# Patient Record
Sex: MALE | Race: WHITE | Employment: FULL TIME | URBAN - METROPOLITAN AREA
[De-identification: names, ages, dates, MRNs, and addresses within clinical notes are randomized per-mention and may not be internally consistent; named-entity substitution may affect disease eponyms.]

---

## 2018-06-19 PROBLEM — G43.109 MIGRAINE WITH AURA AND WITHOUT STATUS MIGRAINOSUS, NOT INTRACTABLE: Status: ACTIVE | Noted: 2018-06-19

## 2018-06-19 PROBLEM — Z72.0 TOBACCO USE: Status: ACTIVE | Noted: 2018-06-19

## 2019-04-04 PROBLEM — Z91.199 NO-SHOW FOR APPOINTMENT: Status: ACTIVE | Noted: 2019-04-04

## 2019-06-27 PROBLEM — K43.9 VENTRAL HERNIA WITHOUT OBSTRUCTION OR GANGRENE: Status: ACTIVE | Noted: 2019-06-27

## 2020-01-13 ENCOUNTER — HOSPITAL ENCOUNTER (OUTPATIENT)
Dept: CT IMAGING | Age: 35
Discharge: HOME | End: 2020-01-13
Attending: SURGERY
Payer: COMMERCIAL

## 2020-01-13 DIAGNOSIS — K43.9 VENTRAL HERNIA WITHOUT OBSTRUCTION OR GANGRENE: ICD-10-CM

## 2020-01-13 PROCEDURE — 74176 CT ABD & PELVIS W/O CONTRAST: CPT

## 2020-02-03 PROBLEM — N20.0 KIDNEY STONES: Status: ACTIVE | Noted: 2020-02-03

## 2020-02-03 PROBLEM — R16.1 SPLENIC MASS: Status: ACTIVE | Noted: 2020-02-03

## 2020-02-03 PROBLEM — J98.6 DIAPHRAGM PARALYSIS: Status: ACTIVE | Noted: 2020-02-03

## 2020-02-07 RX ORDER — SODIUM CHLORIDE 0.9 % (FLUSH) 0.9 %
5-40 SYRINGE (ML) INJECTION AS NEEDED
Status: CANCELLED | OUTPATIENT
Start: 2020-02-07

## 2020-02-07 RX ORDER — SODIUM CHLORIDE 0.9 % (FLUSH) 0.9 %
5-40 SYRINGE (ML) INJECTION EVERY 8 HOURS
Status: CANCELLED | OUTPATIENT
Start: 2020-02-07

## 2020-02-20 ENCOUNTER — HOSPITAL ENCOUNTER (OUTPATIENT)
Dept: LAB | Age: 35
Discharge: HOME | End: 2020-02-20
Payer: COMMERCIAL

## 2020-02-20 ENCOUNTER — ANESTHESIA EVENT (OUTPATIENT)
Dept: SURGERY | Age: 35
End: 2020-02-20
Payer: COMMERCIAL

## 2020-02-21 ENCOUNTER — ANESTHESIA (OUTPATIENT)
Dept: SURGERY | Age: 35
End: 2020-02-21
Payer: COMMERCIAL

## 2020-02-21 ENCOUNTER — HOSPITAL ENCOUNTER (OUTPATIENT)
Age: 35
Setting detail: OUTPATIENT SURGERY
Discharge: HOME | End: 2020-02-21
Attending: SURGERY | Admitting: SURGERY
Payer: COMMERCIAL

## 2020-02-21 VITALS
OXYGEN SATURATION: 93 % | HEIGHT: 67 IN | WEIGHT: 171.13 LBS | TEMPERATURE: 97.7 F | DIASTOLIC BLOOD PRESSURE: 81 MMHG | HEART RATE: 85 BPM | SYSTOLIC BLOOD PRESSURE: 121 MMHG | RESPIRATION RATE: 16 BRPM | BODY MASS INDEX: 26.86 KG/M2

## 2020-02-21 PROBLEM — K43.6 INCARCERATED VENTRAL HERNIA: Status: ACTIVE | Noted: 2019-06-27

## 2020-02-21 PROCEDURE — 77030019908 HC STETH ESOPH SIMS -A: Performed by: ANESTHESIOLOGY

## 2020-02-21 PROCEDURE — 77030039425 HC BLD LARYNG TRULITE DISP TELE -A: Performed by: ANESTHESIOLOGY

## 2020-02-21 PROCEDURE — 76210000020 HC REC RM PH II FIRST 0.5 HR: Performed by: SURGERY

## 2020-02-21 PROCEDURE — 74011250636 HC RX REV CODE- 250/636: Performed by: NURSE ANESTHETIST, CERTIFIED REGISTERED

## 2020-02-21 PROCEDURE — 74011000250 HC RX REV CODE- 250: Performed by: NURSE ANESTHETIST, CERTIFIED REGISTERED

## 2020-02-21 PROCEDURE — 77030040922 HC BLNKT HYPOTHRM STRY -A: Performed by: ANESTHESIOLOGY

## 2020-02-21 PROCEDURE — 76010000149 HC OR TIME 1 TO 1.5 HR: Performed by: SURGERY

## 2020-02-21 PROCEDURE — 77030037088 HC TUBE ENDOTRACH ORAL NSL COVD-A: Performed by: ANESTHESIOLOGY

## 2020-02-21 PROCEDURE — 76060000033 HC ANESTHESIA 1 TO 1.5 HR: Performed by: SURGERY

## 2020-02-21 PROCEDURE — 76210000063 HC OR PH I REC FIRST 0.5 HR: Performed by: SURGERY

## 2020-02-21 PROCEDURE — 77030003029 HC SUT VCRL J&J -B: Performed by: SURGERY

## 2020-02-21 PROCEDURE — 77030018836 HC SOL IRR NACL ICUM -A: Performed by: SURGERY

## 2020-02-21 PROCEDURE — 74011250636 HC RX REV CODE- 250/636: Performed by: ANESTHESIOLOGY

## 2020-02-21 PROCEDURE — C1781 MESH (IMPLANTABLE): HCPCS | Performed by: SURGERY

## 2020-02-21 PROCEDURE — 77030040361 HC SLV COMPR DVT MDII -B: Performed by: SURGERY

## 2020-02-21 PROCEDURE — 77030031139 HC SUT VCRL2 J&J -A: Performed by: SURGERY

## 2020-02-21 PROCEDURE — 77030008462 HC STPLR SKN PROX J&J -A: Performed by: SURGERY

## 2020-02-21 PROCEDURE — 77030002986 HC SUT PROL J&J -A: Performed by: SURGERY

## 2020-02-21 PROCEDURE — 74011250637 HC RX REV CODE- 250/637: Performed by: ANESTHESIOLOGY

## 2020-02-21 PROCEDURE — 74011000250 HC RX REV CODE- 250: Performed by: SURGERY

## 2020-02-21 PROCEDURE — 77030018673: Performed by: SURGERY

## 2020-02-21 PROCEDURE — 74011250636 HC RX REV CODE- 250/636: Performed by: SURGERY

## 2020-02-21 DEVICE — BARD VENTRALEX HERNIA PATCH, 4.3 CM (1.7"), SMALL CIRCLE WITH STRAP
Type: IMPLANTABLE DEVICE | Site: ABDOMEN | Status: FUNCTIONAL
Brand: VENTRALEX

## 2020-02-21 RX ORDER — LIDOCAINE HYDROCHLORIDE 10 MG/ML
0.1 INJECTION INFILTRATION; PERINEURAL AS NEEDED
Status: DISCONTINUED | OUTPATIENT
Start: 2020-02-21 | End: 2020-02-21 | Stop reason: HOSPADM

## 2020-02-21 RX ORDER — KETOROLAC TROMETHAMINE 30 MG/ML
INJECTION, SOLUTION INTRAMUSCULAR; INTRAVENOUS AS NEEDED
Status: DISCONTINUED | OUTPATIENT
Start: 2020-02-21 | End: 2020-02-21 | Stop reason: HOSPADM

## 2020-02-21 RX ORDER — CEFAZOLIN SODIUM/WATER 2 G/20 ML
2 SYRINGE (ML) INTRAVENOUS
Status: COMPLETED | OUTPATIENT
Start: 2020-02-21 | End: 2020-02-21

## 2020-02-21 RX ORDER — PROPOFOL 10 MG/ML
INJECTION, EMULSION INTRAVENOUS AS NEEDED
Status: DISCONTINUED | OUTPATIENT
Start: 2020-02-21 | End: 2020-02-21 | Stop reason: HOSPADM

## 2020-02-21 RX ORDER — BUPIVACAINE HYDROCHLORIDE 2.5 MG/ML
INJECTION, SOLUTION EPIDURAL; INFILTRATION; INTRACAUDAL AS NEEDED
Status: DISCONTINUED | OUTPATIENT
Start: 2020-02-21 | End: 2020-02-21 | Stop reason: HOSPADM

## 2020-02-21 RX ORDER — FAMOTIDINE 20 MG/1
20 TABLET, FILM COATED ORAL ONCE
Status: COMPLETED | OUTPATIENT
Start: 2020-02-21 | End: 2020-02-21

## 2020-02-21 RX ORDER — MIDAZOLAM HYDROCHLORIDE 1 MG/ML
2 INJECTION, SOLUTION INTRAMUSCULAR; INTRAVENOUS
Status: COMPLETED | OUTPATIENT
Start: 2020-02-21 | End: 2020-02-21

## 2020-02-21 RX ORDER — ROCURONIUM BROMIDE 10 MG/ML
INJECTION, SOLUTION INTRAVENOUS AS NEEDED
Status: DISCONTINUED | OUTPATIENT
Start: 2020-02-21 | End: 2020-02-21 | Stop reason: HOSPADM

## 2020-02-21 RX ORDER — SODIUM CHLORIDE, SODIUM LACTATE, POTASSIUM CHLORIDE, CALCIUM CHLORIDE 600; 310; 30; 20 MG/100ML; MG/100ML; MG/100ML; MG/100ML
75 INJECTION, SOLUTION INTRAVENOUS CONTINUOUS
Status: DISCONTINUED | OUTPATIENT
Start: 2020-02-21 | End: 2020-02-21 | Stop reason: HOSPADM

## 2020-02-21 RX ORDER — OXYCODONE HYDROCHLORIDE 5 MG/1
10 TABLET ORAL
Status: DISCONTINUED | OUTPATIENT
Start: 2020-02-21 | End: 2020-02-21 | Stop reason: HOSPADM

## 2020-02-21 RX ORDER — DEXAMETHASONE SODIUM PHOSPHATE 4 MG/ML
INJECTION, SOLUTION INTRA-ARTICULAR; INTRALESIONAL; INTRAMUSCULAR; INTRAVENOUS; SOFT TISSUE AS NEEDED
Status: DISCONTINUED | OUTPATIENT
Start: 2020-02-21 | End: 2020-02-21 | Stop reason: HOSPADM

## 2020-02-21 RX ORDER — BACITRACIN 50000 [IU]/1
INJECTION, POWDER, FOR SOLUTION INTRAMUSCULAR AS NEEDED
Status: DISCONTINUED | OUTPATIENT
Start: 2020-02-21 | End: 2020-02-21 | Stop reason: HOSPADM

## 2020-02-21 RX ORDER — OXYCODONE HYDROCHLORIDE 5 MG/1
5 TABLET ORAL
Status: DISCONTINUED | OUTPATIENT
Start: 2020-02-21 | End: 2020-02-21 | Stop reason: HOSPADM

## 2020-02-21 RX ORDER — HYDROMORPHONE HYDROCHLORIDE 2 MG/ML
0.5 INJECTION, SOLUTION INTRAMUSCULAR; INTRAVENOUS; SUBCUTANEOUS
Status: DISCONTINUED | OUTPATIENT
Start: 2020-02-21 | End: 2020-02-21 | Stop reason: HOSPADM

## 2020-02-21 RX ORDER — GENTAMICIN SULFATE 80 MG/100ML
80 INJECTION, SOLUTION INTRAVENOUS ONCE
Status: COMPLETED | OUTPATIENT
Start: 2020-02-21 | End: 2020-02-21

## 2020-02-21 RX ORDER — FENTANYL CITRATE 50 UG/ML
INJECTION, SOLUTION INTRAMUSCULAR; INTRAVENOUS AS NEEDED
Status: DISCONTINUED | OUTPATIENT
Start: 2020-02-21 | End: 2020-02-21 | Stop reason: HOSPADM

## 2020-02-21 RX ORDER — EPHEDRINE SULFATE/0.9% NACL/PF 50 MG/5 ML
SYRINGE (ML) INTRAVENOUS AS NEEDED
Status: DISCONTINUED | OUTPATIENT
Start: 2020-02-21 | End: 2020-02-21 | Stop reason: HOSPADM

## 2020-02-21 RX ORDER — SODIUM CHLORIDE 0.9 % (FLUSH) 0.9 %
5-40 SYRINGE (ML) INJECTION EVERY 8 HOURS
Status: DISCONTINUED | OUTPATIENT
Start: 2020-02-21 | End: 2020-02-21 | Stop reason: HOSPADM

## 2020-02-21 RX ORDER — SODIUM CHLORIDE 0.9 % (FLUSH) 0.9 %
5-40 SYRINGE (ML) INJECTION AS NEEDED
Status: DISCONTINUED | OUTPATIENT
Start: 2020-02-21 | End: 2020-02-21 | Stop reason: HOSPADM

## 2020-02-21 RX ORDER — ONDANSETRON 2 MG/ML
INJECTION INTRAMUSCULAR; INTRAVENOUS AS NEEDED
Status: DISCONTINUED | OUTPATIENT
Start: 2020-02-21 | End: 2020-02-21 | Stop reason: HOSPADM

## 2020-02-21 RX ORDER — LIDOCAINE HYDROCHLORIDE 20 MG/ML
INJECTION, SOLUTION EPIDURAL; INFILTRATION; INTRACAUDAL; PERINEURAL AS NEEDED
Status: DISCONTINUED | OUTPATIENT
Start: 2020-02-21 | End: 2020-02-21 | Stop reason: HOSPADM

## 2020-02-21 RX ADMIN — KETOROLAC TROMETHAMINE 30 MG: 30 INJECTION, SOLUTION INTRAMUSCULAR; INTRAVENOUS at 10:02

## 2020-02-21 RX ADMIN — SODIUM CHLORIDE, SODIUM LACTATE, POTASSIUM CHLORIDE, AND CALCIUM CHLORIDE 75 ML/HR: 600; 310; 30; 20 INJECTION, SOLUTION INTRAVENOUS at 07:51

## 2020-02-21 RX ADMIN — Medication 2 G: at 09:32

## 2020-02-21 RX ADMIN — MIDAZOLAM HYDROCHLORIDE 2 MG: 1 INJECTION, SOLUTION INTRAMUSCULAR; INTRAVENOUS at 09:03

## 2020-02-21 RX ADMIN — PROPOFOL 200 MG: 10 INJECTION, EMULSION INTRAVENOUS at 09:19

## 2020-02-21 RX ADMIN — ROCURONIUM BROMIDE 10 MG: 10 INJECTION, SOLUTION INTRAVENOUS at 09:36

## 2020-02-21 RX ADMIN — FENTANYL CITRATE 100 MCG: 50 INJECTION INTRAMUSCULAR; INTRAVENOUS at 09:19

## 2020-02-21 RX ADMIN — ONDANSETRON 4 MG: 2 INJECTION INTRAMUSCULAR; INTRAVENOUS at 09:57

## 2020-02-21 RX ADMIN — Medication 5 MG: at 09:28

## 2020-02-21 RX ADMIN — ROCURONIUM BROMIDE 40 MG: 10 INJECTION, SOLUTION INTRAVENOUS at 09:20

## 2020-02-21 RX ADMIN — SUGAMMADEX 400 MG: 100 INJECTION, SOLUTION INTRAVENOUS at 10:05

## 2020-02-21 RX ADMIN — LIDOCAINE HYDROCHLORIDE 100 MG: 20 INJECTION, SOLUTION EPIDURAL; INFILTRATION; INTRACAUDAL; PERINEURAL at 09:19

## 2020-02-21 RX ADMIN — GENTAMICIN SULFATE 80 MG: 80 INJECTION, SOLUTION INTRAVENOUS at 08:49

## 2020-02-21 RX ADMIN — FAMOTIDINE 20 MG: 20 TABLET ORAL at 07:51

## 2020-02-21 RX ADMIN — DEXAMETHASONE SODIUM PHOSPHATE 4 MG: 4 INJECTION, SOLUTION INTRAMUSCULAR; INTRAVENOUS at 09:46

## 2020-02-21 NOTE — DISCHARGE INSTRUCTIONS
Leave dressing until follow-up. Try to keep it dry    No heavy lifting (>5lbs) for 6 weeks to reduce risk of developing a recurrent hernia. No driving until you are off pain meds for 24hrs and have no pain with movements associated with driving. Pain prescription (Norco) on chart for patient to use as needed for pain  Follow-up with Dr Khushboo Garcia in 10 days on a Monday in the office at:  [address], [address]    ACTIVITY  · As tolerated and as directed by your doctor. · Bathe or shower as directed by your doctor. DIET  · Clear liquids until no nausea or vomiting; then light diet for the first day. · Advance to regular diet on second day, unless your doctor orders otherwise. · If nausea and vomiting continues, call your doctor. PAIN  · Take pain medication as directed by your doctor. · Call your doctor if pain is NOT relieved by medication. · DO NOT take aspirin of blood thinners unless directed by your doctor. CALL YOUR DOCTOR IF   · Excessive bleeding that does not stop after holding pressure over the area  · Temperature of 101 degrees F or above  · Excessive redness, swelling or bruising, and/ or green or yellow, smelly discharge from incision       After general anesthesia or intravenous sedation, for 24 hours or while taking prescription Narcotics:  · Limit your activities  · Do not drive and operate hazardous machinery  · Do not make important personal or business decisions  · Do  not drink alcoholic beverages  · If you have not urinated within 8 hours after discharge, please contact your surgeon on call. *  Please give a list of your current medications to your Primary Care Provider. *  Please update this list whenever your medications are discontinued, doses are      changed, or new medications (including over-the-counter products) are added. *  Please carry medication information at all times in case of emergency situations.       These are general instructions for a healthy lifestyle:    No smoking/ No tobacco products/ Avoid exposure to second hand smoke    Surgeon General's Warning:  Quitting smoking now greatly reduces serious risk to your health. Obesity, smoking, and sedentary lifestyle greatly increases your risk for illness    A healthy diet, regular physical exercise & weight monitoring are important for maintaining a healthy lifestyle    You may be retaining fluid if you have a history of heart failure or if you experience any of the following symptoms:  Weight gain of 3 pounds or more overnight or 5 pounds in a week, increased swelling in our hands or feet or shortness of breath while lying flat in bed. Please call your doctor as soon as you notice any of these symptoms; do not wait until your next office visit. Recognize signs and symptoms of STROKE:    F-face looks uneven    A-arms unable to move or move unevenly    S-speech slurred or non-existent    T-time-call 911 as soon as signs and symptoms begin-DO NOT go       Back to bed or wait to see if you get better-TIME IS BRAIN.

## 2020-02-21 NOTE — H&P
H&P/Consult Note/Progress Note/Office Note:   Debby Rodriguez  MRN: [id number]  FND:[birth year]  Age:34 y.o.    HPI: Debby Rodriguez is a 27 y.o. male who is here for ventral hernia reapair with mesh on 2/21/20. Prior to surgery he saw me on 6/27/19 with bulging in his upper abdomen. He noticed this with straining and valsalva. He could hear noise in the upper abdomen when he palpated this. No N/V. Nothing seems to make his symptoms better or worse. He had open heart surgery as a child to repair for Tetralogy of Fallot. I previously repaired a right indirect inguinal hernia with mesh on 3/23/12. He is doing well from this and has no symptoms bulging or pain in the groin. 1/13/20 CT abd/pelvis without contrast  Hx:  Ventral hernia, abdominal pain  COMPARISON: None     FINDINGS:  -LUNG BASES: Left diaphragm is elevated with associated scarring/atelectasis in the left lung base. Right lung base is clear.     -LIVER: Normal in size and appearance. -GALLBLADDER/BILE DUCTS: No gallstones or bile duct dilatation. -PANCREAS: Normal.  -SPLEEN: Spleen is normal in size and appearance. There is a 16 mm mass in the splenic hilum near the stomach and tail of the pancreas.     -ADRENALS: Normal.  -KIDNEYS/URETERS: Small nonobstructing stones in both kidneys. No hydronephrosis or significant mass. -BLADDER: Normal.  -REPRODUCTIVE ORGANS: No pelvic masses.     -BOWEL: Normal caliber. No inflammatory changes. -LYMPH NODES: No significant retroperitoneal, mesenteric, or pelvic adenopathy. There are surgical staples in the left groin. -BONES: No fracture or significant bone lesion. -VASCULATURE: Normal  -OTHER: There is a midline ventral hernia just above the umbilicus. Defect in the peritoneum measures 2.4 cm diameter. There is no bowel involvement.     IMPRESSION:   1. Small ventral hernia, no bowel involvement. 2.  Elevated left diaphragm, most likely a paralyzed diaphragm.   3. Small indeterminate mass in the splenic hilum. Most likely not significant  given the patient's age and lack of other findings. Consider follow-up to document stability.   4.  Nonobstructing stones in both kidneys.             Past Medical History:   Diagnosis Date    CAD (coronary artery disease)     patient has had aortic valve replaced twice at 4.1/2 and age 23 pig valve    History of valve replacement     S/P REPAIR TETRALOGY OF FALLOT AS CHILD    Inguinal hernia     Migraine with aura and without status migrainosus, not intractable 6/19/2018    Other forms of migraine, without mention of intractable migraine without mention of status migrainosus     Other psoriasis     PUD (peptic ulcer disease)     patient denies , but said he thought he had one in the past, never diagnose    Smoker     Unspecified essential hypertension     no treatment , required at present     Past Surgical History:   Procedure Laterality Date    CARDIAC SURG PROCEDURE UNLIST  2005/1988    valve replacements, aortic    [address] and 2005    x2    HX HEENT Bilateral age a small child    corrective for amblioplia    HX HERNIA REPAIR  2012    right inguinal    HX ORTHOPAEDIC Right     right knee bone chip rem'd    VASCULAR SURGERY PROCEDURE UNLIST  as an infant    right  axillary surgery-shunt,      Current Facility-Administered Medications   Medication Dose Route Frequency    ceFAZolin (ANCEF) 2 g/20 mL in sterile water IV syringe  2 g IntraVENous ON CALL TO OR    lidocaine (XYLOCAINE) 10 mg/mL (1 %) injection 0.1 mL  0.1 mL SubCUTAneous PRN    lactated Ringers infusion  75 mL/hr IntraVENous CONTINUOUS    sodium chloride (NS) flush 5-40 mL  5-40 mL IntraVENous Q8H    sodium chloride (NS) flush 5-40 mL  5-40 mL IntraVENous PRN    gentamicin in Saline (Iso-osm) (GARAMYCIN) 80 mg/100 mL IVPB premix 80 mg  80 mg IntraVENous ONCE     Bactrim [sulfamethoprim] and Demerol [meperidine]  Social History Socioeconomic History    Marital status:      Spouse name: Not on file    Number of children: Not on file    Years of education: Not on file    Highest education level: Not on file   Tobacco Use    Smoking status: Current Every Day Smoker     Packs/day: 1.00     Years: 12.00     Pack years: 12.00    Smokeless tobacco: Never Used    Tobacco comment: x 10 years. Advised to quit is tapering off. Substance and Sexual Activity    Alcohol use: No    Drug use: No     Social History     Tobacco Use   Smoking Status Current Every Day Smoker    Packs/day: 1.00    Years: 12.00    Pack years: 12.00   Smokeless Tobacco Never Used   Tobacco Comment    x 10 years. Advised to quit is tapering off. Family History   Problem Relation Age of Onset    Hypertension Mother     Hypertension Father     No Known Problems Sister     No Known Problems Brother     Diabetes Maternal Grandfather     Cancer Paternal Grandfather     Stroke Neg Hx     Heart Disease Neg Hx      ROS: The patient has no difficulty with chest pain or shortness of breath. No fever or chills. Comprehensive review of systems was otherwise unremarkable except as noted above. Physical Exam:   Visit Vitals  /89 (BP 1 Location: Left arm, BP Patient Position: At rest;Supine)   Pulse 81   Temp 97.8 °F (36.6 °C)   Resp 18   Ht 5' 7\" (1.702 m)   Wt 171 lb 2 oz (77.6 kg)   SpO2 100%   BMI 26.80 kg/m²     Vitals:    02/21/20 0735   BP: 151/89   Pulse: 81   Resp: 18   Temp: 97.8 °F (36.6 °C)   SpO2: 100%   Weight: 171 lb 2 oz (77.6 kg)   Height: 5' 7\" (1.702 m)     [facility]  [unfilled]    Constitutional: Alert, oriented, cooperative patient in no acute distress; appears stated age    Eyes:Sclera are clear. EOMs intact  ENMT: no external lesions gross hearing normal; no obvious neck masses, no ear or lip lesions, nares normal  CV: RRR. Normal perfusion  Resp: No JVD. Breathing is  non-labored; no audible wheezing.       GI: soft and non-distended  He has a healed midline sternal incision which extends onto his upper abdomen with 2 large scars from chest tube sites. He is a fairly large reducible hernia in his upper midline between his umbilicus and xiphoid. RIH repair is intact  Healed incision  No testicular masses    Musculoskeletal: unremarkable with normal function. No embolic signs or cyanosis. Neuro:  Oriented; moves all 4; no focal deficits  Psychiatric: normal affect and mood, no memory impairment    Recent vitals (if inpt):  @IPVITALS(24:)@    Labs:  Recent Labs     02/20/20  1500   HGB 14.3       Lab Results   Component Value Date/Time    WBC 7.1 06/25/2018 08:22 AM    HGB 14.3 02/20/2020 03:00 PM    PLATELET 817 53/31/5435 08:22 AM    Sodium 141 06/25/2018 08:22 AM    Potassium 4.5 06/25/2018 08:22 AM    Chloride 102 06/25/2018 08:22 AM    CO2 25 06/25/2018 08:22 AM    BUN 11 06/25/2018 08:22 AM    Creatinine 1.23 06/25/2018 08:22 AM    Glucose 84 06/25/2018 08:22 AM    Bilirubin, total 0.6 06/25/2018 08:22 AM    AST (SGOT) 19 06/25/2018 08:22 AM    ALT (SGPT) 13 06/25/2018 08:22 AM    Alk. phosphatase 75 06/25/2018 08:22 AM       CT Results  (Last 48 hours)    None        chest X-ray      I reviewed recent labs, recent radiologic studies, and pertinent records including other doctor notes if needed. I independently reviewed radiology images for studies I described above or studies I have ordered.    Admission date (for inpatients): 2/21/2020   [unfilled]  [unfilled]    ASSESSMENT/PLAN:  Problem List  Date Reviewed: 2/3/2020          Codes Class Noted    1.6cm hilar splenic mass on CT Jan, 2020 ICD-10-CM: R16.1  ICD-9-CM: 789.2  2/3/2020        Bilat asymptomatic Kidney stones seen on CT Jan, 2020 ICD-10-CM: N20.0  ICD-9-CM: 592.0  2/3/2020        Left hemidiaphragmatic elevation shown on Jan, 20202 CT (likely paralysis from childhood heart surgery) ICD-10-CM: J98.6  ICD-9-CM: 519.4  2/3/2020        Ventral hernia without obstruction or gangrene ICD-10-CM: K43.9  ICD-9-CM: 553.20  6/27/2019        No-show for appointment ICD-10-CM: Z53.29  ICD-9-CM: V64.2  4/4/2019        Migraine with aura and without status migrainosus, not intractable ICD-10-CM: G43.109  ICD-9-CM: 346.00  6/19/2018        Tobacco use ICD-10-CM: Z72.0  ICD-9-CM: 305.1  6/19/2018        Other forms of migraine, without mention of intractable migraine without mention of status migrainosus ICD-10-CM: G43.809  ICD-9-CM: 346.80  Unknown        Other psoriasis ICD-10-CM: L40.8  ICD-9-CM: 696.1  Unknown        Essential hypertension ICD-10-CM: I10  ICD-9-CM: 401.9  Unknown        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 We discussed risks/benefits and advantages/disadvantages of ventral hernia repair. Informed consent obtained for ventral hernia repair with possible mesh. We will schedule this for him soon. I have personally performed a face-to-face diagnostic evaluation and management  service on this patient. I have independently seen the patient. I have independently obtained the above history from the patient/family. I have independently examined the patient with above findings. I have independently reviewed data/labs for this patient and developed the above plan of care (MDM). Signed: Flakito Cherry.  Meryle Mealing, MD, FACS

## 2020-02-21 NOTE — ANESTHESIA POSTPROCEDURE EVALUATION
Procedure(s): HERNIA VENTRAL REPAIR WITH MESH. general    Anesthesia Post Evaluation      Multimodal analgesia: multimodal analgesia not used between 6 hours prior to anesthesia start to PACU discharge  Patient location during evaluation: PACU  Patient participation: complete - patient participated  Level of consciousness: awake and alert  Pain management: adequate  Airway patency: patent  Anesthetic complications: no  Cardiovascular status: hemodynamically stable  Respiratory status: acceptable  Hydration status: acceptable        Vitals Value Taken Time   /95 2/21/2020 10:34 AM   Temp 36.5 °C (97.7 °F) 2/21/2020 10:26 AM   Pulse 90 2/21/2020 10:35 AM   Resp 16 2/21/2020 10:34 AM   SpO2 96 % 2/21/2020 10:35 AM   Vitals shown include unvalidated device data.

## 2020-02-21 NOTE — ANESTHESIA PREPROCEDURE EVALUATION
Relevant Problems   No relevant active problems       Anesthetic History               Review of Systems / Medical History  Patient summary reviewed and pertinent labs reviewed    Pulmonary          Smoker         Neuro/Psych              Cardiovascular    Hypertension: well controlled              Exercise tolerance: >4 METS  Comments: Tetrology repair age 2  AVR age 23   GI/Hepatic/Renal     GERD: well controlled           Endo/Other             Other Findings   Comments: Paralyzed left hemidiaphragm         Physical Exam    Airway  Mallampati: I  TM Distance: > 6 cm  Neck ROM: normal range of motion   Mouth opening: Normal     Cardiovascular    Rhythm: regular  Rate: normal    Murmur: Grade 2, Mitral area     Dental  No notable dental hx       Pulmonary  Breath sounds clear to auscultation               Abdominal         Other Findings            Anesthetic Plan    ASA: 3  Anesthesia type: general            Anesthetic plan and risks discussed with: Patient

## 2020-02-21 NOTE — OP NOTES
[facility]  OPERATIVE REPORT    Name:  Misael Tee  MR#:  [id number]  :  1985  ACCOUNT #:  [de-identified]  DATE OF SERVICE:  2020    PREOPERATIVE DIAGNOSIS:  Ventral hernia. POSTOPERATIVE DIAGNOSIS:  Incarcerated ventral hernia. PROCEDURE PERFORMED:  Open repair of incarcerated ventral hernia with mesh (CPT O8591609, [id number]). SURGEON:  Francoise Orlando. Marie Mazariegos MD    ASSISTANT:  None. ANESTHESIA:  General.    COMPLICATIONS:  None. SPECIMENS REMOVED:  None. IMPLANTS:  A 4-cm circular Ventralex mesh. ESTIMATED BLOOD LOSS:  Less than 10 mL. OPERATIVE PROCEDURE:  The patient was taken to the operating room and placed in the supine position. After adequate anesthesia was given, his abdomen was prepped and draped in a sterile fashion with multiple layers of Betadine scrub and Betadine solution. Time-out protocol was followed. He was given prophylactic antibiotics consisting of Ancef and gentamicin as per Anesthesia recommendation given that he had a prior tetralogy of Fallot surgery in the past.  His abdomen was prepped and draped with Betadine scrub and Betadine paint and the Betadine paint was allowed to dry. He was covered with an Waynette Rogers to avoid any skin contact. An upper midline incision was made a few centimeters above the umbilicus. The subcutaneous tissues were dissected down to the anterior aspect of the hernia sac. The hernia sac was dissected down to its base. It was a little complicated and multi-lobulated. It was incarcerated as well. It could not be reduced without a lot of dissection. We had to free up all the fascial adhesions circumferentially around the defect. Ultimately with a lot of dissection and manipulation, the incarceration was able to be reduced. It did not appear that hernia contained bowel. Irrigation was used. Hemostasis was confirmed. The fascial defect could not be closed without tension.   For this reason, we brought up a small circular 4-cm piece of Ventralex mesh and soaked it in bacitracin. We placed the mesh in the proper orientation in a subfascial location and secured the mesh through good healthy bites of fascia with #1 Prolene sutures. The hospital was out of zero Prolene sutures. Irrigation was used. Hemostasis was confirmed. The hernia defect was repaired after the sutures were tied. Loose portions of fibrous tissue and hernia sac were brought back together into apposition over the anterior aspect of the mesh with 2-0 Vicryl suture. Marcaine had been infiltrated into the wound for postoperative analgesia. Hemostasis was confirmed. Additional bacitracin irrigation was used and suctioned free. The deep dermis was reapproximated with 3-0 Vicryl suture. The skin was closed with clips. Sterile dressing was placed consisting of sterile 4x4's and a large remaining portion of Ioban drape to exclude the incisional area from external contact in the immediate postoperative period. The patient was taken to recovery in good condition. He tolerated the procedure well.         Adrian Mauricio MD [id number]  D:  02/21/2020 13:45  T:  02/21/2020 18:14  JOB #:  [id number]

## 2020-02-21 NOTE — PERIOP NOTES
PACU DISCHARGE NOTE    Vital signs stable, pain well controlled, alert and oriented times three or at baseline, follow up per surgeon, no anesthetic complications. Discharge instructions and prescription for Norco given to pt and his wife, Marnie Ac. Both voice understanding of instructions. Pt is tolerating PO, is without c/o pain or discomfort, and has had his IV removed intact. Pt to discharge door via wheelchair with Tori Bowels, nurse tech and left in care of his wife who has his belongings.

## 2020-06-22 ENCOUNTER — HOSPITAL ENCOUNTER (OUTPATIENT)
Dept: CT IMAGING | Age: 35
Discharge: HOME | End: 2020-06-22
Attending: SURGERY
Payer: COMMERCIAL

## 2020-06-22 DIAGNOSIS — R16.1 SPLENIC MASS: ICD-10-CM

## 2020-06-22 PROCEDURE — 74177 CT ABD & PELVIS W/CONTRAST: CPT

## 2020-06-22 PROCEDURE — 74011636320 HC RX REV CODE- 636/320: Performed by: SURGERY

## 2020-06-22 PROCEDURE — 74011000258 HC RX REV CODE- 258: Performed by: SURGERY

## 2020-06-22 RX ORDER — SODIUM CHLORIDE 0.9 % (FLUSH) 0.9 %
10 SYRINGE (ML) INJECTION
Status: COMPLETED | OUTPATIENT
Start: 2020-06-22 | End: 2020-06-22

## 2020-06-22 RX ADMIN — Medication 10 ML: at 09:37

## 2020-06-22 RX ADMIN — DIATRIZOATE MEGLUMINE AND DIATRIZOATE SODIUM 15 ML: 660; 100 LIQUID ORAL; RECTAL at 09:37

## 2020-06-22 RX ADMIN — IOPAMIDOL 100 ML: 755 INJECTION, SOLUTION INTRAVENOUS at 09:37

## 2020-06-22 RX ADMIN — SODIUM CHLORIDE 100 ML: 900 INJECTION, SOLUTION INTRAVENOUS at 09:37

## 2022-03-18 PROBLEM — G43.109 MIGRAINE WITH AURA AND WITHOUT STATUS MIGRAINOSUS, NOT INTRACTABLE: Status: ACTIVE | Noted: 2018-06-19

## 2022-03-19 PROBLEM — Z72.0 TOBACCO USE: Status: ACTIVE | Noted: 2018-06-19

## 2022-03-19 PROBLEM — K43.6 INCARCERATED VENTRAL HERNIA: Status: ACTIVE | Noted: 2019-06-27

## 2022-03-19 PROBLEM — J98.6 DIAPHRAGM PARALYSIS: Status: ACTIVE | Noted: 2020-02-03

## 2022-03-19 PROBLEM — N20.0 KIDNEY STONES: Status: ACTIVE | Noted: 2020-02-03

## 2022-03-20 PROBLEM — Z91.199 NO-SHOW FOR APPOINTMENT: Status: ACTIVE | Noted: 2019-04-04

## 2022-03-20 PROBLEM — R16.1 SPLENIC MASS: Status: ACTIVE | Noted: 2020-02-03

## 2023-05-24 ENCOUNTER — OFFICE VISIT (OUTPATIENT)
Dept: SURGERY | Age: 38
End: 2023-05-24
Payer: COMMERCIAL

## 2023-05-24 VITALS — BODY MASS INDEX: 25.58 KG/M2 | HEIGHT: 67 IN | WEIGHT: 163 LBS

## 2023-05-24 DIAGNOSIS — K43.6 INCARCERATED VENTRAL HERNIA: Primary | ICD-10-CM

## 2023-05-24 PROCEDURE — 99213 OFFICE O/P EST LOW 20 MIN: CPT | Performed by: SURGERY

## 2024-01-29 ENCOUNTER — OFFICE VISIT (OUTPATIENT)
Dept: INTERNAL MEDICINE CLINIC | Facility: CLINIC | Age: 39
End: 2024-01-29

## 2024-01-29 VITALS
TEMPERATURE: 97.7 F | BODY MASS INDEX: 26.84 KG/M2 | WEIGHT: 171 LBS | DIASTOLIC BLOOD PRESSURE: 88 MMHG | OXYGEN SATURATION: 97 % | SYSTOLIC BLOOD PRESSURE: 104 MMHG | HEIGHT: 67 IN | HEART RATE: 95 BPM

## 2024-01-29 DIAGNOSIS — F51.04 PSYCHOPHYSIOLOGICAL INSOMNIA: ICD-10-CM

## 2024-01-29 DIAGNOSIS — G43.109 MIGRAINE WITH AURA AND WITHOUT STATUS MIGRAINOSUS, NOT INTRACTABLE: Primary | ICD-10-CM

## 2024-01-29 PROCEDURE — 3074F SYST BP LT 130 MM HG: CPT | Performed by: INTERNAL MEDICINE

## 2024-01-29 PROCEDURE — 99213 OFFICE O/P EST LOW 20 MIN: CPT | Performed by: INTERNAL MEDICINE

## 2024-01-29 PROCEDURE — 3079F DIAST BP 80-89 MM HG: CPT | Performed by: INTERNAL MEDICINE

## 2024-01-29 RX ORDER — IBUPROFEN 600 MG/1
600 TABLET ORAL EVERY 8 HOURS PRN
Qty: 18 TABLET | Refills: 2 | Status: SHIPPED | OUTPATIENT
Start: 2024-01-29 | End: 2024-02-28

## 2024-01-29 RX ORDER — ONDANSETRON 4 MG/1
4 TABLET, ORALLY DISINTEGRATING ORAL 3 TIMES DAILY PRN
Qty: 21 TABLET | Refills: 0 | Status: SHIPPED | OUTPATIENT
Start: 2024-01-29

## 2024-01-29 RX ORDER — SUMATRIPTAN 50 MG/1
TABLET, FILM COATED ORAL
Qty: 16 TABLET | Refills: 5 | Status: SHIPPED | OUTPATIENT
Start: 2024-01-29

## 2024-01-29 RX ORDER — SUMATRIPTAN 50 MG/1
TABLET, FILM COATED ORAL
Qty: 16 TABLET | Refills: 1 | Status: SHIPPED | OUTPATIENT
Start: 2024-01-29 | End: 2024-01-29 | Stop reason: SDUPTHER

## 2024-01-29 SDOH — ECONOMIC STABILITY: FOOD INSECURITY: WITHIN THE PAST 12 MONTHS, THE FOOD YOU BOUGHT JUST DIDN'T LAST AND YOU DIDN'T HAVE MONEY TO GET MORE.: NEVER TRUE

## 2024-01-29 SDOH — ECONOMIC STABILITY: HOUSING INSECURITY
IN THE LAST 12 MONTHS, WAS THERE A TIME WHEN YOU DID NOT HAVE A STEADY PLACE TO SLEEP OR SLEPT IN A SHELTER (INCLUDING NOW)?: NO

## 2024-01-29 SDOH — ECONOMIC STABILITY: FOOD INSECURITY: WITHIN THE PAST 12 MONTHS, YOU WORRIED THAT YOUR FOOD WOULD RUN OUT BEFORE YOU GOT MONEY TO BUY MORE.: NEVER TRUE

## 2024-01-29 SDOH — ECONOMIC STABILITY: INCOME INSECURITY: HOW HARD IS IT FOR YOU TO PAY FOR THE VERY BASICS LIKE FOOD, HOUSING, MEDICAL CARE, AND HEATING?: NOT HARD AT ALL

## 2024-01-29 ASSESSMENT — ENCOUNTER SYMPTOMS: NAUSEA: 1

## 2024-01-29 NOTE — PROGRESS NOTES
Balaji was seen today for follow-up, migraine, nausea and headache.    Diagnoses and all orders for this visit:    Migraine with aura and without status migrainosus, not intractable  Comments:  ask drink more water,hydrate    Psychophysiological insomnia  Comments:  advise 3-4 mg melatonin every evening    Other orders  -     Discontinue: SUMAtriptan (IMITREX) 50 MG tablet; 1 at onset can repeat every 2 hours if migraine not resolved, max 200 mg in 24 hours  -     ibuprofen (ADVIL;MOTRIN) 600 MG tablet; Take 1 tablet by mouth every 8 hours as needed for Pain  -     SUMAtriptan (IMITREX) 50 MG tablet; 1 at onset can repeat every 2 hours if migraine not resolved, max 200 mg in 24 hours  -     ondansetron (ZOFRAN-ODT) 4 MG disintegrating tablet; Take 1 tablet by mouth 3 times daily as needed for Nausea or Vomiting          Balaji Quintero is a 38 y.o. male    Chief Complaint   Patient presents with    Follow-up    Migraine     1-2 monthly    Nausea    Headache     daily       Migraines says affect ability to work  Nausea    Virtual Visit on 06/02/2020   Component Date Value Ref Range Status    WBC 06/09/2020 6.0  3.4 - 10.8 x10E3/uL Final    RBC 06/09/2020 5.09  4.14 - 5.80 x10E6/uL Final    Hemoglobin 06/09/2020 15.6  13.0 - 17.7 g/dL Final    Hematocrit 06/09/2020 45.3  37.5 - 51.0 % Final    MCV 06/09/2020 89  79 - 97 fL Final    MCH 06/09/2020 30.6  26.6 - 33.0 pg Final    MCHC 06/09/2020 34.4  31.5 - 35.7 g/dL Final    RDW 06/09/2020 12.1  11.6 - 15.4 % Final    Platelets 06/09/2020 182  150 - 450 x10E3/uL Final    Neutrophils % 06/09/2020 50  Not Estab. % Final    Lymphocytes % 06/09/2020 33  Not Estab. % Final    Monocytes % 06/09/2020 11  Not Estab. % Final    Eosinophils % 06/09/2020 4  Not Estab. % Final    Basophils % 06/09/2020 1  Not Estab. % Final    Neutrophils Absolute 06/09/2020 3.1  1.4 - 7.0 x10E3/uL Final    Lymphocytes Absolute 06/09/2020 2.0  0.7 - 3.1 x10E3/uL Final    Monocytes Absolute

## 2024-03-12 ENCOUNTER — TELEPHONE (OUTPATIENT)
Dept: INTERNAL MEDICINE CLINIC | Facility: CLINIC | Age: 39
End: 2024-03-12

## 2024-03-19 ENCOUNTER — OFFICE VISIT (OUTPATIENT)
Dept: INTERNAL MEDICINE CLINIC | Facility: CLINIC | Age: 39
End: 2024-03-19

## 2024-03-19 VITALS
DIASTOLIC BLOOD PRESSURE: 72 MMHG | OXYGEN SATURATION: 92 % | HEART RATE: 88 BPM | HEIGHT: 67 IN | SYSTOLIC BLOOD PRESSURE: 130 MMHG | BODY MASS INDEX: 27.47 KG/M2 | WEIGHT: 175 LBS | TEMPERATURE: 98.4 F

## 2024-03-19 DIAGNOSIS — E66.3 OVERWEIGHT: ICD-10-CM

## 2024-03-19 DIAGNOSIS — Q21.3 TETRALOGY OF FALLOT WITH ABSENT PULMONARY VALVE: ICD-10-CM

## 2024-03-19 DIAGNOSIS — Z86.79 HISTORY OF HYPERTENSION: ICD-10-CM

## 2024-03-19 DIAGNOSIS — G43.109 MIGRAINE WITH AURA AND WITHOUT STATUS MIGRAINOSUS, NOT INTRACTABLE: Primary | ICD-10-CM

## 2024-03-19 DIAGNOSIS — Z72.0 TOBACCO USE: ICD-10-CM

## 2024-03-19 PROCEDURE — 3075F SYST BP GE 130 - 139MM HG: CPT | Performed by: NURSE PRACTITIONER

## 2024-03-19 PROCEDURE — 99214 OFFICE O/P EST MOD 30 MIN: CPT | Performed by: NURSE PRACTITIONER

## 2024-03-19 PROCEDURE — 3078F DIAST BP <80 MM HG: CPT | Performed by: NURSE PRACTITIONER

## 2024-03-19 RX ORDER — UBROGEPANT 100 MG/1
TABLET ORAL
Qty: 16 TABLET | Refills: 5 | Status: SHIPPED | OUTPATIENT
Start: 2024-03-19 | End: 2024-03-21 | Stop reason: SDUPTHER

## 2024-03-19 RX ORDER — TOPIRAMATE 50 MG/1
TABLET, FILM COATED ORAL
Qty: 60 TABLET | Refills: 3 | Status: SHIPPED | OUTPATIENT
Start: 2024-03-19

## 2024-03-19 ASSESSMENT — PATIENT HEALTH QUESTIONNAIRE - PHQ9
1. LITTLE INTEREST OR PLEASURE IN DOING THINGS: NOT AT ALL
SUM OF ALL RESPONSES TO PHQ9 QUESTIONS 1 & 2: 0
SUM OF ALL RESPONSES TO PHQ QUESTIONS 1-9: 0
2. FEELING DOWN, DEPRESSED OR HOPELESS: NOT AT ALL
SUM OF ALL RESPONSES TO PHQ QUESTIONS 1-9: 0

## 2024-03-19 ASSESSMENT — ENCOUNTER SYMPTOMS
BLURRED VISION: 1
VOMITING: 1
PHOTOPHOBIA: 1

## 2024-03-19 NOTE — PROGRESS NOTES
Balaji Quintero (:  1985) is a 38 y.o. male,Established patient, here for evaluation of the following chief complaint(s):  Migraine (Imitrex not helping/)         ASSESSMENT/PLAN:  1. Migraine with aura and without status migrainosus, not intractable  2. Overweight  -     Microalbumin / Creatinine Urine Ratio; Future  -     Hemoglobin A1C; Future  -     Lipid Panel; Future  -     TSH; Future  -     Comprehensive Metabolic Panel; Future  -     CBC; Future  3. Tobacco use  -     Microalbumin / Creatinine Urine Ratio; Future  -     Hemoglobin A1C; Future  -     Lipid Panel; Future  -     TSH; Future  -     Comprehensive Metabolic Panel; Future  -     CBC; Future  4. History of hypertension  -     Microalbumin / Creatinine Urine Ratio; Future  -     Hemoglobin A1C; Future  -     Lipid Panel; Future  -     TSH; Future  -     Comprehensive Metabolic Panel; Future  -     CBC; Future  5. 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       Migraines, not adequately controlled, he is missing work with the migraines  Try ubrelvy for abortive trx, discussed med risks/side effects  Restart topamax, took years ago, discussed med risks/side effects  Check routine lab  F/u 8 week to see efficacy for migraines  Schedule f/u with cardiology    Subjective   SUBJECTIVE/OBJECTIVE:  Patient is here for follow up of migraines. He is getting migraines 1x a week at least. The migraines last half a day and then he gets a leftover hangover migraine headache that lasts a few days. He gets blurry vision, nausea, numbness in finger. The numbness is only when he gets sever migraine and has to sleep it off in dark quiet room.   He has blackout curtains.     Migraine   The current episode started more than 1 year ago (since childhood). The pain is located in the Temporal and left unilateral (left sided usually, but sometimes all over the head) region. The pain does not radiate. The

## 2024-03-21 RX ORDER — UBROGEPANT 100 MG/1
TABLET ORAL
Qty: 9 TABLET | Refills: 5 | Status: SHIPPED | OUTPATIENT
Start: 2024-03-21

## 2024-07-17 ENCOUNTER — TELEPHONE (OUTPATIENT)
Dept: INTERNAL MEDICINE CLINIC | Facility: CLINIC | Age: 39
End: 2024-07-17

## 2024-07-17 ENCOUNTER — OFFICE VISIT (OUTPATIENT)
Dept: INTERNAL MEDICINE CLINIC | Facility: CLINIC | Age: 39
End: 2024-07-17
Payer: COMMERCIAL

## 2024-07-17 VITALS
BODY MASS INDEX: 26.84 KG/M2 | HEART RATE: 96 BPM | DIASTOLIC BLOOD PRESSURE: 90 MMHG | WEIGHT: 171 LBS | HEIGHT: 67 IN | OXYGEN SATURATION: 97 % | SYSTOLIC BLOOD PRESSURE: 130 MMHG | TEMPERATURE: 97.5 F

## 2024-07-17 DIAGNOSIS — Z86.79 HISTORY OF HYPERTENSION: Primary | ICD-10-CM

## 2024-07-17 DIAGNOSIS — Z00.00 ROUTINE ADULT HEALTH MAINTENANCE: ICD-10-CM

## 2024-07-17 DIAGNOSIS — Z72.0 TOBACCO USE: ICD-10-CM

## 2024-07-17 DIAGNOSIS — G43.109 MIGRAINE WITH AURA AND WITHOUT STATUS MIGRAINOSUS, NOT INTRACTABLE: ICD-10-CM

## 2024-07-17 DIAGNOSIS — Q21.3 TETRALOGY OF FALLOT WITH ABSENT PULMONARY VALVE: ICD-10-CM

## 2024-07-17 LAB
ALBUMIN SERPL-MCNC: 4 G/DL (ref 3.5–5)
ALBUMIN/GLOB SERPL: 1.2 (ref 1–1.9)
ALP SERPL-CCNC: 72 U/L (ref 40–129)
ALT SERPL-CCNC: 17 U/L (ref 12–65)
ANION GAP SERPL CALC-SCNC: 10 MMOL/L (ref 9–18)
AST SERPL-CCNC: 22 U/L (ref 15–37)
BILIRUB SERPL-MCNC: 0.5 MG/DL (ref 0–1.2)
BUN SERPL-MCNC: 12 MG/DL (ref 6–23)
CALCIUM SERPL-MCNC: 9.7 MG/DL (ref 8.8–10.2)
CHLORIDE SERPL-SCNC: 104 MMOL/L (ref 98–107)
CO2 SERPL-SCNC: 26 MMOL/L (ref 20–28)
CREAT SERPL-MCNC: 1.22 MG/DL (ref 0.8–1.3)
ERYTHROCYTE [DISTWIDTH] IN BLOOD BY AUTOMATED COUNT: 12.3 % (ref 11.9–14.6)
GLOBULIN SER CALC-MCNC: 3.4 G/DL (ref 2.3–3.5)
GLUCOSE SERPL-MCNC: 103 MG/DL (ref 70–99)
HCT VFR BLD AUTO: 46.1 % (ref 41.1–50.3)
HGB BLD-MCNC: 15.6 G/DL (ref 13.6–17.2)
MAGNESIUM SERPL-MCNC: 1.7 MG/DL (ref 1.8–2.4)
MCH RBC QN AUTO: 31.4 PG (ref 26.1–32.9)
MCHC RBC AUTO-ENTMCNC: 33.8 G/DL (ref 31.4–35)
MCV RBC AUTO: 92.8 FL (ref 82–102)
NRBC # BLD: 0 K/UL (ref 0–0.2)
PLATELET # BLD AUTO: 237 K/UL (ref 150–450)
PMV BLD AUTO: 13.1 FL (ref 9.4–12.3)
POTASSIUM SERPL-SCNC: 4.7 MMOL/L (ref 3.5–5.1)
PROT SERPL-MCNC: 7.5 G/DL (ref 6.3–8.2)
RBC # BLD AUTO: 4.97 M/UL (ref 4.23–5.6)
SODIUM SERPL-SCNC: 140 MMOL/L (ref 136–145)
TSH W FREE THYROID IF ABNORMAL: 1.19 UIU/ML (ref 0.27–4.2)
WBC # BLD AUTO: 7.5 K/UL (ref 4.3–11.1)

## 2024-07-17 PROCEDURE — 99214 OFFICE O/P EST MOD 30 MIN: CPT | Performed by: INTERNAL MEDICINE

## 2024-07-17 PROCEDURE — 3075F SYST BP GE 130 - 139MM HG: CPT | Performed by: INTERNAL MEDICINE

## 2024-07-17 PROCEDURE — 3080F DIAST BP >= 90 MM HG: CPT | Performed by: INTERNAL MEDICINE

## 2024-07-17 RX ORDER — ESCITALOPRAM OXALATE 5 MG/1
5 TABLET ORAL DAILY
Qty: 30 TABLET | Refills: 5 | Status: SHIPPED | OUTPATIENT
Start: 2024-07-17

## 2024-07-17 RX ORDER — TOPIRAMATE 50 MG/1
TABLET, FILM COATED ORAL
Qty: 225 TABLET | Refills: 1 | Status: SHIPPED | OUTPATIENT
Start: 2024-07-17

## 2024-07-17 ASSESSMENT — ENCOUNTER SYMPTOMS
NAUSEA: 1
CHEST TIGHTNESS: 0

## 2024-07-17 NOTE — PROGRESS NOTES
Balaji was seen today for follow-up and migraine.    Diagnoses and all orders for this visit:    History of hypertension  -     Comprehensive Metabolic Panel; Future  -     CBC; Future  -     TSH with Reflex; Future  -     Lipid Panel; Future  -     Magnesium; Future  -     Magnesium  -     TSH with Reflex  -     CBC  -     Comprehensive Metabolic Panel    Migraine with aura and without status migrainosus, not intractable  -     Comprehensive Metabolic Panel; Future  -     CBC; Future  -     TSH with Reflex; Future  -     Lipid Panel; Future  -     Magnesium; Future  -     Magnesium  -     TSH with Reflex  -     CBC  -     Comprehensive Metabolic Panel    Tobacco use  -     Comprehensive Metabolic Panel; Future  -     CBC; Future  -     TSH with Reflex; Future  -     Lipid Panel; Future  -     Magnesium; Future  -     Magnesium  -     TSH with Reflex  -     CBC  -     Comprehensive Metabolic Panel    Routine adult health maintenance  -     Comprehensive Metabolic Panel; Future  -     CBC; Future  -     TSH with Reflex; Future  -     Lipid Panel; Future  -     Magnesium; Future  -     Magnesium  -     TSH with Reflex  -     CBC  -     Comprehensive Metabolic Panel    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 male    Chief Complaint   Patient presents with    Follow-up     6 month check up labs at visit    Migraine     F/U meds not working was out of work 7/15/24 ? Can he get a note ?     Sleep is so so,   Stress anxiety  Headaches still . topamax    Virtual Visit on 06/02/2020   Component Date Value Ref Range Status    WBC 06/09/2020 6.0  3.4 - 10.8 x10E3/uL Final    RBC 06/09/2020 5.09  4.14 - 5.80 x10E6/uL Final    Hemoglobin 06/09/2020 15.6

## 2024-07-17 NOTE — TELEPHONE ENCOUNTER
Myranda at [facility] pharmacy needs for you to call her back about   topiramate (TOPAMAX) 50 MG tablet     [phone]

## 2024-08-05 ENCOUNTER — TELEPHONE (OUTPATIENT)
Dept: INTERNAL MEDICINE CLINIC | Facility: CLINIC | Age: 39
End: 2024-08-05

## (undated) DEVICE — SPONGE LAP 18X18IN STRL -- 5/PK

## (undated) DEVICE — MEDI-VAC NON-CONDUCTIVE SUCTION TUBING: Brand: CARDINAL HEALTH

## (undated) DEVICE — TRAY PREP DRY W/ PREM GLV 2 APPL 6 SPNG 2 UNDPD 1 OVERWRAP

## (undated) DEVICE — MINOR SPLIT GENERAL: Brand: MEDLINE INDUSTRIES, INC.

## (undated) DEVICE — GARMENT,MEDLINE,DVT,INT,CALF,MED, GEN2: Brand: MEDLINE

## (undated) DEVICE — SUTURE VCRL SZ 3-0 L18IN ABSRB UD L26MM SH 1/2 CIR J864D

## (undated) DEVICE — MARKER UTIL W/RULER AND LBL -- STRL

## (undated) DEVICE — SYR LR LCK 1ML GRAD NSAF 30ML --

## (undated) DEVICE — 3M™ IOBAN™ 2 ANTIMICROBIAL INCISE DRAPE 6650EZ: Brand: IOBAN™ 2

## (undated) DEVICE — 3M™ TEGADERM™ TRANSPARENT FILM DRESSING FRAME STYLE, 1626W, 4 IN X 4-3/4 IN (10 CM X 12 CM), 50/CT 4CT/CASE: Brand: 3M™ TEGADERM™

## (undated) DEVICE — BLADE SURG NO15 S STL STR DISP GLASSVAN

## (undated) DEVICE — DRAPE,TOP,102X53,STERILE: Brand: MEDLINE

## (undated) DEVICE — BUTTON SWITCH PENCIL BLADE ELECTRODE, HOLSTER: Brand: EDGE

## (undated) DEVICE — SPONGE: SPECIALTY PEANUT XR 100/CS: Brand: MEDICAL ACTION INDUSTRIES

## (undated) DEVICE — BLADE SCALP SURG BARD-PARK 10 --

## (undated) DEVICE — SHEET, T, LAPAROTOMY, STERILE: Brand: MEDLINE

## (undated) DEVICE — SYR 10ML LUER LOK 1/5ML GRAD --

## (undated) DEVICE — STANDARD HYPODERMIC NEEDLE,POLYPROPYLENE HUB: Brand: MONOJECT

## (undated) DEVICE — REM POLYHESIVE ADULT PATIENT RETURN ELECTRODE: Brand: VALLEYLAB

## (undated) DEVICE — SUTURE VCRL SZ 2-0 L27IN ABSRB UD L26MM SH 1/2 CIR J417H

## (undated) DEVICE — SOLUTION IV 1000ML 0.9% SOD CHL

## (undated) DEVICE — Device

## (undated) DEVICE — SUTURE PROL SZ 1 L30IN NONABSORBABLE BLU L40MM CT 1/2 CIR 8435H